# Patient Record
Sex: FEMALE | URBAN - METROPOLITAN AREA
[De-identification: names, ages, dates, MRNs, and addresses within clinical notes are randomized per-mention and may not be internally consistent; named-entity substitution may affect disease eponyms.]

---

## 2021-04-13 ENCOUNTER — OTHER (OUTPATIENT)
Dept: URBAN - METROPOLITAN AREA CLINIC 18 | Facility: CLINIC | Age: 76
Setting detail: DERMATOLOGY
End: 2021-04-13

## 2021-04-13 DIAGNOSIS — L20.84 INTRINSIC (ALLERGIC) ECZEMA: ICD-10-CM

## 2021-04-13 PROBLEM — L57.0 ACTINIC KERATOSIS: Status: RESOLVED | Noted: 2021-04-13

## 2021-04-13 PROBLEM — I78.9 DISEASE OF CAPILLARIES, UNSPECIFIED: Status: RESOLVED | Noted: 2021-04-13

## 2021-04-13 PROCEDURE — 17003 DESTRUCT PREMALG LES 2-14: CPT

## 2021-04-13 PROCEDURE — 99202 OFFICE O/P NEW SF 15 MIN: CPT

## 2021-04-13 PROCEDURE — 11102 TANGNTL BX SKIN SINGLE LES: CPT

## 2021-04-13 PROCEDURE — 17000 DESTRUCT PREMALG LESION: CPT

## 2025-02-06 ENCOUNTER — APPOINTMENT (OUTPATIENT)
Dept: URBAN - METROPOLITAN AREA SURGERY 11 | Age: 80
Setting detail: DERMATOLOGY
End: 2025-02-06

## 2025-02-06 DIAGNOSIS — L57.0 ACTINIC KERATOSIS: ICD-10-CM

## 2025-02-06 PROCEDURE — OTHER OTHER: OTHER

## 2025-02-06 PROCEDURE — 17000 DESTRUCT PREMALG LESION: CPT

## 2025-02-06 PROCEDURE — 17003 DESTRUCT PREMALG LES 2-14: CPT

## 2025-02-06 PROCEDURE — OTHER LIQUID NITROGEN: OTHER

## 2025-02-06 PROCEDURE — OTHER MIPS QUALITY: OTHER

## 2025-02-06 PROCEDURE — OTHER COUNSELING: OTHER

## 2025-02-06 ASSESSMENT — LOCATION DETAILED DESCRIPTION DERM
LOCATION DETAILED: LEFT INFERIOR LATERAL MALAR CHEEK
LOCATION DETAILED: LEFT NASAL SIDEWALL
LOCATION DETAILED: RIGHT INFERIOR CENTRAL MALAR CHEEK
LOCATION DETAILED: LEFT MEDIAL MANDIBULAR CHEEK
LOCATION DETAILED: NASAL SUPRATIP
LOCATION DETAILED: RIGHT CENTRAL MALAR CHEEK
LOCATION DETAILED: LEFT INFERIOR CENTRAL MALAR CHEEK
LOCATION DETAILED: LEFT CENTRAL MANDIBULAR CHEEK
LOCATION DETAILED: RIGHT LATERAL BUCCAL CHEEK
LOCATION DETAILED: NASAL DORSUM

## 2025-02-06 ASSESSMENT — LOCATION ZONE DERM
LOCATION ZONE: FACE
LOCATION ZONE: NOSE

## 2025-02-06 ASSESSMENT — LOCATION SIMPLE DESCRIPTION DERM
LOCATION SIMPLE: LEFT CHEEK
LOCATION SIMPLE: RIGHT CHEEK
LOCATION SIMPLE: NOSE
LOCATION SIMPLE: LEFT NOSE

## 2025-02-06 NOTE — PROCEDURE: OTHER
Render Risk Assessment In Note?: no
Note Text (......Xxx Chief Complaint.): This diagnosis correlates with the
Detail Level: Zone
Other (Free Text): Discussed treating with cryo vs 5FU and patient chose to treat with cryo today

## 2025-02-06 NOTE — PROCEDURE: MIPS QUALITY
Detail Level: Detailed
Name And Contact Information For Health Care Proxy: Shahida Montalvo
Quality 47: Advance Care Plan: Advance Care Planning discussed and documented; advance care plan or surrogate decision maker documented in the medical record.
Quality 226: Preventive Care And Screening: Tobacco Use: Screening And Cessation Intervention: Patient screened for tobacco use and is an ex/non-smoker

## 2025-02-06 NOTE — PROCEDURE: LIQUID NITROGEN
Detail Level: Zone
Render In Bullet Format When Appropriate: No
Post-Care Instructions: I reviewed with the patient in detail post-care instructions. Patient is to wear sunprotection, and avoid picking at any of the treated lesions. Pt may apply Vaseline to crusted or scabbing areas.
Consent: The patient's consent was obtained including but not limited to risks of crusting, scabbing, blistering, scarring, darker or lighter pigmentary change, recurrence, incomplete removal and infection.
Duration Of Freeze Thaw-Cycle (Seconds): 0
Total Number Of Aks Treated: 8

## 2025-03-06 ENCOUNTER — APPOINTMENT (OUTPATIENT)
Dept: URBAN - METROPOLITAN AREA SURGERY 11 | Age: 80
Setting detail: DERMATOLOGY
End: 2025-03-06

## 2025-03-06 DIAGNOSIS — L81.0 POSTINFLAMMATORY HYPERPIGMENTATION: ICD-10-CM

## 2025-03-06 DIAGNOSIS — L57.0 ACTINIC KERATOSIS: ICD-10-CM

## 2025-03-06 PROCEDURE — 17000 DESTRUCT PREMALG LESION: CPT

## 2025-03-06 PROCEDURE — 99212 OFFICE O/P EST SF 10 MIN: CPT | Mod: 25

## 2025-03-06 PROCEDURE — OTHER LIQUID NITROGEN: OTHER

## 2025-03-06 PROCEDURE — 17003 DESTRUCT PREMALG LES 2-14: CPT

## 2025-03-06 PROCEDURE — OTHER COUNSELING: OTHER

## 2025-03-06 ASSESSMENT — LOCATION ZONE DERM: LOCATION ZONE: FACE

## 2025-03-06 ASSESSMENT — LOCATION SIMPLE DESCRIPTION DERM: LOCATION SIMPLE: LEFT CHEEK

## 2025-03-06 ASSESSMENT — LOCATION DETAILED DESCRIPTION DERM
LOCATION DETAILED: LEFT CENTRAL MANDIBULAR CHEEK
LOCATION DETAILED: LEFT INFERIOR LATERAL MALAR CHEEK
LOCATION DETAILED: LEFT INFERIOR CENTRAL MALAR CHEEK
LOCATION DETAILED: LEFT MEDIAL MALAR CHEEK

## 2025-06-12 ENCOUNTER — APPOINTMENT (OUTPATIENT)
Dept: URBAN - METROPOLITAN AREA SURGERY 11 | Age: 80
Setting detail: DERMATOLOGY
End: 2025-06-16

## 2025-06-12 DIAGNOSIS — L57.0 ACTINIC KERATOSIS: ICD-10-CM

## 2025-06-12 PROCEDURE — OTHER COUNSELING: OTHER

## 2025-06-12 PROCEDURE — OTHER LIQUID NITROGEN: OTHER

## 2025-06-12 PROCEDURE — 17000 DESTRUCT PREMALG LESION: CPT

## 2025-06-12 ASSESSMENT — LOCATION SIMPLE DESCRIPTION DERM: LOCATION SIMPLE: LEFT CHEEK

## 2025-06-12 ASSESSMENT — LOCATION DETAILED DESCRIPTION DERM: LOCATION DETAILED: LEFT INFERIOR CENTRAL MALAR CHEEK

## 2025-06-12 ASSESSMENT — LOCATION ZONE DERM: LOCATION ZONE: FACE
